# Patient Record
Sex: FEMALE | Race: WHITE | Employment: OTHER | URBAN - METROPOLITAN AREA
[De-identification: names, ages, dates, MRNs, and addresses within clinical notes are randomized per-mention and may not be internally consistent; named-entity substitution may affect disease eponyms.]

---

## 2017-04-03 ENCOUNTER — HOSPITAL ENCOUNTER (OUTPATIENT)
Dept: PHYSICAL THERAPY | Age: 72
Discharge: HOME | End: 2017-04-03
Payer: MEDICARE

## 2017-04-03 PROCEDURE — G8979 MOBILITY GOAL STATUS: HCPCS

## 2017-04-03 PROCEDURE — G8978 MOBILITY CURRENT STATUS: HCPCS

## 2017-04-03 PROCEDURE — 97162 PT EVAL MOD COMPLEX 30 MIN: CPT

## 2017-04-03 PROCEDURE — 97110 THERAPEUTIC EXERCISES: CPT

## 2017-04-03 NOTE — PROGRESS NOTES
PHYSICAL THERAPY - DAILY TREATMENT NOTE    Patient Name: Emily Bowie        Date: 4/3/2017  : 1945   YES Patient  Verified  Visit #:      of   8  Insurance: Payor: Bhargavi Edenilson / Plan: [place] MED[place] PART A & B / Product Type: Medicare /      In time: 2:10 Out time: 3:00   Total Treatment Time: 50     Medicare Time Tracking (below)   Total Timed Codes (min):  10 1:1 Treatment Time:  10     TREATMENT AREA =  L lumbar radiculopathy    SUBJECTIVE    Pain Level (on 0 to 10 scale):  1  / 10   Medication Changes/New allergies or changes in medical history, any new surgeries or procedures? NO    If yes, update Summary List   Subjective Functional Status/Changes:  []  No changes reported     Been ~1 year, started in the hip, now going into front of thigh, burning pain. \"GP thinks its a pinched nerve in the back. \" Had Xray but haven't gotten results back  \"I'm good for about 10 minutes of walking, then the pain starts and I have to sit down. \"  Reports numbness anterior L shin and dorsum of foot, not into toes; noticed for the first time over the weekend when walking a lot. Walking increases pain, sitting relieves pain. No longer taking pain meds (aleve)   1/10  Currently, 8/10 at the worst.           OBJECTIVE  LOW BACK EVALUATION      Previous Treatment: None    PMHx:see chart    Social/Recreational/Work: began exercising the gym 2016, 3-4x/wk    Pt Goals:decrease pain    General Health: Red Flags Indicated?  [ ] Yes Judith.[place] ] No  [ ] Yes [ ] No Recent weight change (If yes, due to dieting? [ ] Yes [ ] No)   [ ] Yes [ ] No Weakness in legs during walking  [ ] Yes [ ] No Unremitting pain at night  [ ] Yes [ ] No Abdominal pain or problems  [ ] Yes [ ] No Rectal bleeding  [ ] Yes [ ] No Feet more cold or painful in cold weather  [ ] Yes [ ] No Menstrual irregularities  [ ] Yes [ ] No Blood or pain with urination  [ ] Yes [ ] No Dysfunction of bowel or bladder  [ ] Yes [ ] No Recent illness within past 3 weeks (i.e, cold, flu)  [ ] Yes [ ] No Numbness/tingling in buttock/genitalia region          Objective:     Gait: unremarkable     Posture: slouched sitting posture, decreased lumbar lordosis    Palpation/Sensation: unremarkable     (N - normal; R - reduced; MR - markedly reduced)       L/S ROM      Range   Effect  Strength (MMT)          Right        Left    Flex WFL  Psoas (L2,3) 4- 3+   Ext 50%  Quads (L3) 5 5   R-lat flex WFL  Ant tib(L4) 5 5   L-lat flex WFL  Ext Iyer (L4,L5) NT NT   R-rot WFL  Glut Med(L5) 4- 4-   L-rot WFL  Peroneals (L5,S1) NT NT      Hamstrings(S1,S2) 5 4+     Strength (MMT)       Right     Left          Gastroc (S1,S2) NT NT   Glut Max (S1,S2) 4- 4-        Core: Sit Up 3 3   Core: Side Bridge NT NT   Core: Prone plank NT NT          Special Tests                       Right     Left          Flexibility         Right              Left    Slump   90/90 HS Lacking 25 deg Lacking 30 deg   SLR   Phillip Adamberg     Sl screen 3/5=70% LR   Tessie     Gaenslen test   Hip IR (prone)     Elkin/VALENTE sign   Hip ER (prone)     Thigh thrust        Distraction        Lateral Compression                  Effect of:  PETER No effect   Supine Bridge No effect   SL Supine Bridge                      SI Symmetry:    Standing        Supine            Prone                Dynamic      +/- R/L  ASIS  L sup, R inf  Fwd Flex    IC    Stork    PSIS   L inf, R sup Seated FF        10 min Therapeutic Exercise:  [x]  See flow sheet   Rationale:      increase ROM, increase strength, improve coordination, improve balance and increase proprioception to improve the patients ability to perform ADLs and functional mobility w/ ease     W/ TE min Patient Education:  YES  Reviewed HEP   []  Progressed/Changed HEP based on:   Issued HEP     Other Objective/Functional Measures:    FOTO 45/100, indicating 55% functional limitation   Post Treatment Pain Level (on 0 to 10) scale:   1  / 10     ASSESSMENT  Assessment/Changes in Function:     Justification for Eval Code Complexity:  Patient History (low 0, mod 1-2, high 3-4): asthma, chronicity of pain, scoliosis- HIGH  Examination (low 1-2, mod 3+, high 4+): decreased strength, decreased flexibility, impaired activity tolerance- MEDIUM   Clinical Presentation (low stable or uncomplicated, mod evolving or changing, high unstable or unpredictable): MEDIUM  Clinical Decision Making (low , mod 26-74, high 1-25): FOTO 45/100- MEDIUM     []  See Progress Note/Recertification   Patient will continue to benefit from skilled PT services to modify and progress therapeutic interventions, address functional mobility deficits, address ROM deficits, address strength deficits, analyze and address soft tissue restrictions, analyze and cue movement patterns, analyze and modify body mechanics/ergonomics, assess and modify postural abnormalities, address imbalance/dizziness and instruct in home and community integration to attain remaining goals. Progress toward goals / Updated goals:    See POC. PLAN    [x]  Upgrade activities as tolerated YES Continue plan of care   []  Discharge due to :    []  Other:      Therapist: Joellen Blum, PT    Date: 4/3/2017 Time: 2:09 PM     No future appointments.

## 2017-04-03 NOTE — PROGRESS NOTES
[address] [facility] PHYSICAL THERAPY  [address] [facility] [place], [address] - Phone: [phone]  Fax: [phone] / [address] [facility]  Patient Name: Lena Kate : 1945   Medical   Diagnosis: Low back pain [M54.5]  Lumbar radiculopathy [M54.16] Treatment Diagnosis: Low back pain, Lumbar radiculopathy   Onset Date: Chronic, ~1 year ago     Referral Source: Niels Swanson MD [facility]): 4/3/2017   Prior Hospitalization: See medical history Provider #: [id number]   Prior Level of Function: Independent, sedentary lifestyle   Comorbidities: Asthma, cerebral aneurysm    Medications: Verified on Patient Summary List   The Plan of Care and following information is based on the information from the initial evaluation.   ===========================================================================================  Assessment / key information:  Patient is a 73y.o. year old female with chief complaint of pain in low back, referring to L hip and anterior thigh. Patient reports pain in L hip began ~ 1 year ago, since has increased and refers into anterior thigh. Pain described as burning, currently 1/10 and 8/10 at its worst. Aggravating factors include walking > 10 minutes, alleviating factors include sitting. Patient with decreased LE/core strength, decreased ROM, increased muscle tightness. Unable to reproduce symptoms with repeated movements; pt reports the only thing that reproduces symptoms is walking and occasionally unsupported sitting. Pt demonstrates L ASIS superior in supine, L PSIS inferior in prone, (+) long sit test L short to long, indicating possible SIJ component. Patient with a Functional Status score of 45/100 on FOTO (Focused on Therapeutic Outcomes), which corresponds to a functional limitation of 55%.   Patient will benefit from skilled physical therapy services to address these issues.  ===========================================================================================  Eval Complexity: History: HIGH Complexity :3+ comorbidities / personal factors will impact the outcome/ POC Exam:MEDIUM Complexity : 3 Standardized tests and measures addressing body structure, function, activity limitation and / or participation in recreation  Presentation: MEDIUM Complexity : Evolving with changing characteristics  Clinical Decision Making:MEDIUM Complexity : FOTO score of 26-74Overall Complexity:MEDIUM    Problem List: pain affecting function, decrease ROM, decrease strength, impaired gait/ balance, decrease ADL/ functional abilitiies, decrease activity tolerance, decrease flexibility/ joint mobility and decrease transfer abilities   Treatment Plan may include any combination of the following: Therapeutic exercise, Therapeutic activities, Neuromuscular re-education, Physical agent/modality, Gait/balance training, Manual therapy, Aquatic therapy, Patient education, Self Care training, Functional mobility training, Home safety training and Stair training  Patient / Family readiness to learn indicated by: asking questions, trying to perform skills and interest  Persons(s) to be included in education: patient (P)  Barriers to Learning/Limitations: None  Measures taken: FOTO 45/100, indicating 55% functional impairment   Patient Goal (s): Decrease pain   Patient self reported health status: good  Rehabilitation Potential: good   Short Term Goals: To be accomplished in  2  weeks:  1. Patient will report able to walk >10 minutes w/out increase in symptoms for improved functional mobility. 2.  Patient will be compliant with home exercise program.     Long Term Goals: To be accomplished in  4  weeks:  1. Patient will increase FOTO Functional Status score to 56/100 to indicate decreased functional limitations.   2.  Patient will demonstrate B hip abd MMT >/= 4/5 for increased pelvic stability with gait.  3.  Patient will be independent with home exercise program for self management of symptoms. Frequency / Duration:   Patient to be seen  2  times per week for 4  weeks:  Patient / Caregiver education and instruction: self care, activity modification and exercises  G-Codes (GP): Mobility [id number] Current  CK= 40-59%   Goal  CK= 40-59%. The severity rating is based on the FOTO Score    Therapist Signature: Tati José PT Date: 1/8/4587   Certification Period: 4/3/2017-7/2/2017 Time: 3:24 PM   ===========================================================================================  I certify that the above Physical Therapy Services are being furnished while the patient is under my care. I agree with the treatment plan and certify that this therapy is necessary. Physician Signature:        Date:       Time:     Please sign and return to [employer] or you may fax the signed copy to [phone]. Thank you.

## 2017-04-05 ENCOUNTER — HOSPITAL ENCOUNTER (OUTPATIENT)
Dept: PHYSICAL THERAPY | Age: 72
Discharge: HOME | End: 2017-04-05
Payer: MEDICARE

## 2017-04-05 PROCEDURE — 97110 THERAPEUTIC EXERCISES: CPT

## 2017-04-05 NOTE — PROGRESS NOTES
PHYSICAL THERAPY - DAILY TREATMENT NOTE    Patient Name: Rebecca Boykin        Date: 2017  : 1945   YES Patient  Verified  Visit #:   2   of   8  Insurance: Payor: Nata Camel / Plan: [place] MEDICARE PART A & B / Product Type: Medicare /      In time: 9:05 Out time: 9:40   Total Treatment Time: 35     Medicare Time Tracking (below)   Total Timed Codes (min):  35 1:1 Treatment Time:  35     TREATMENT AREA =  L lumbar radiculopathy    SUBJECTIVE    Pain Level (on 0 to 10 scale):  0  / 10   Medication Changes/New allergies or changes in medical history, any new surgeries or procedures? NO    If yes, update Summary List   Subjective Functional Status/Changes:  []  No changes reported     Reports compliance with HEP. States she called the doctor yesterday and had them fax x ray report (see paper chart). OBJECTIVE    35 min Therapeutic Exercise:  [x]  See flow sheet   Rationale:      increase ROM and increase strength to improve the patients ability to perform ADL's, gait, and functional mobility with increased safety and decreased pain. min Patient Education:  YES  Reviewed HEP   []  Progressed/Changed HEP based on: Added HEP per handout in paper chart     Other Objective/Functional Measures: Added several exercises per flowsheet in paper chart. VC's for correct form with S/L clams     Post Treatment Pain Level (on 0 to 10) scale:   0  / 10     ASSESSMENT    Assessment/Changes in Function:     Tolerated exercises without c/o's pain.      []  See Progress Note/Recertification   Patient will continue to benefit from skilled PT services to modify and progress therapeutic interventions, address functional mobility deficits, address ROM deficits, address strength deficits, analyze and address soft tissue restrictions, analyze and cue movement patterns, analyze and modify body mechanics/ergonomics, assess and modify postural abnormalities and instruct in home and community integration to attain remaining goals. Progress toward goals / Updated goals: · Short Term Goals: To be accomplished in 2 weeks:  1. Patient will report able to walk >10 minutes w/out increase in symptoms for improved functional mobility. NT  2.  Patient will be compliant with home exercise program. Progressed HEP.        PLAN    [x]  Upgrade activities as tolerated YES Continue plan of care   []  Discharge due to :    []  Other:      Therapist: Ayden Waldron PT    Date: 4/5/2017 Time: 9:14 AM     Future Appointments  Date Time Provider Scott Metz   4/12/2017 5:30 PM Tara Morton [facility]   4/14/2017 2:00 PM [facility]   4/17/2017 3:30 PM Ayden Waldron PT [facility]   4/20/2017 11:30 AM Tara Morton [facility]   4/24/2017 11:00 AM Steve Sena [facility]   4/27/2017 2:00 PM Joselito [facility]

## 2017-04-07 ENCOUNTER — HOSPITAL ENCOUNTER (OUTPATIENT)
Dept: LAB | Age: 72
Discharge: HOME | End: 2017-04-07
Payer: MEDICARE

## 2017-04-07 PROCEDURE — 88305 TISSUE EXAM BY PATHOLOGIST: CPT | Performed by: PLASTIC SURGERY

## 2017-04-12 ENCOUNTER — HOSPITAL ENCOUNTER (OUTPATIENT)
Dept: PHYSICAL THERAPY | Age: 72
Discharge: HOME | End: 2017-04-12
Payer: MEDICARE

## 2017-04-12 PROCEDURE — 97530 THERAPEUTIC ACTIVITIES: CPT

## 2017-04-12 PROCEDURE — 97110 THERAPEUTIC EXERCISES: CPT

## 2017-04-12 NOTE — PROGRESS NOTES
PHYSICAL THERAPY - DAILY TREATMENT NOTE  -    Patient Name: Mary Carmen Hines        Date: 2017  : 1945   YES Patient  Verified  Visit #:   3   of   8  Insurance: Payor: [place] MEDICARE / Plan: [place] MEDICARE PART A & B / Product Type: Medicare /      In time: 1:00 Out time: 1:55   Total Treatment Time: 55 min     Medicare Time Tracking (below)   Total Timed Codes (min):  55 1:1 Treatment Time:  55     TREATMENT AREA =  Low back pain [M54.5]  Lumbar radiculopathy [M54.16]    SUBJECTIVE    Pain Level (on 0 to 10 scale):  0  / 10   Medication Changes/New allergies or changes in medical history, any new surgeries or procedures? NO    If yes, update Summary List   Subjective Functional Status/Changes:  []  No changes reported     \"I feel better but I still have that pain when I walk for 10 minutes. \"        OBJECTIVE    30 (30) min Therapeutic Exercise:  [x]  See flow sheet   Rationale:      increase ROM, increase strength, improve coordination, improve balance and increase proprioception to improve the patients ability to ambulate and perform transfers with improved core stability and LE strength.     25 (25) min Therapeutic Activity: Gait on TM; inc step rate (see below)   Rationale: To improve gait mechanics and strike pattern to improve ambulation endurance and dec onset of L hip pain    1 min Patient Education:  YES  Reviewed HEP   []  Progressed/Changed HEP based on:   (see below)     Other Objective/Functional Measures:    Assessed gait mechanics on TM. Patient ambulated with increased ankle angle of inclination and increased stride length. Pt steps per minute calculated to shanel 110-115 at 2.6mph. Provided patient external cueing with metronome to increase step rate to 122 steps/minute and patient demo decreased ankle inclination angle and improved tibial position at heel strike.  Patient able to ambulate 11 minutes without pain  - Provided patient with name of ladonna to use on phone ([employer]) to practice ambulating with metronome set to 120-125 bpm    Added bridges, hip abduction/extension standing, TA draw w/ marches     Post Treatment Pain Level (on 0 to 10) scale:   0  / 10     ASSESSMENT    Assessment/Changes in Function:     Patient ambulated 6' in clinic on TM without pain today. Understood instructions to utilize metronome to increase step rate. Tolerated therex progression today without increase in symptoms. []  See Progress Note/Recertification   Patient will continue to benefit from skilled PT services to modify and progress therapeutic interventions, address functional mobility deficits, address ROM deficits, address strength deficits, analyze and address soft tissue restrictions, analyze and cue movement patterns, analyze and modify body mechanics/ergonomics, assess and modify postural abnormalities, address imbalance/dizziness and instruct in home and community integration to attain remaining goals. Progress toward goals / Updated goals: · Short Term Goals: To be accomplished in 2 weeks:  1. Patient will report able to walk >10 minutes w/out increase in symptoms for improved functional mobility. Initiated inc step rate pattern for HEP  2.  Patient will be compliant with home exercise program. Reports compliance       PLAN    [x]  Upgrade activities as tolerated YES Continue plan of care   []  Discharge due to :    []  Other:      Therapist: Keary Favre, DPT    Date: 4/12/2017 Time: 1:29 PM     Future Appointments  Date Time Provider Scott Metz   4/14/2017 2:00 PM [facility]   4/17/2017 3:30 PM Carlin Valdez PT [facility]   4/20/2017 10:30 AM Carlin Valdez PT [facility]   4/24/2017 11:00 AM Missy Foote [facility]   4/27/2017 2:00 PM Patti Hilario [facility]

## 2017-04-14 ENCOUNTER — HOSPITAL ENCOUNTER (OUTPATIENT)
Dept: PHYSICAL THERAPY | Age: 72
Discharge: HOME | End: 2017-04-14
Payer: MEDICARE

## 2017-04-14 PROCEDURE — 97110 THERAPEUTIC EXERCISES: CPT

## 2017-04-14 NOTE — PROGRESS NOTES
PHYSICAL THERAPY - DAILY TREATMENT NOTE      Patient Name: Mayte Loredo        Date: 2017  : 1945   YES Patient  Verified  Visit #:   4   of   8  Insurance: Payor: [place] MEDICARE / Plan: [place] MEDICARE PART A & B / Product Type: Medicare /      In time: 1:50 Out time: 2:32   Total Treatment Time: 42     Medicare Time Tracking (below)   Total Timed Codes (min):  42 1:1 Treatment Time:  42     TREATMENT AREA =  Low back pain [M54.5]  Lumbar radiculopathy [M54.16]    SUBJECTIVE    Pain Level (on 0 to 10 scale):  0  / 10   Medication Changes/New allergies or changes in medical history, any new surgeries or procedures? NO    If yes, update Summary List   Subjective Functional Status/Changes:  []  No changes reported     \"I haven't had leg pain since last session. \"        OBJECTIVE    42 (42) min Therapeutic Exercise:  [x]  See flow sheet   Rationale:      increase strength, improve coordination, improve balance and increase proprioception to improve the patients ability to ambulate without LE pain and to improve core strength/stability. 1 min Patient Education:  YES  Reviewed HEP   []  Progressed/Changed HEP based on: Other Objective/Functional Measures:    TM warm up at 3.0 mph x 5 min with metronome set to 120 bpm. Patient without c/o L posterior thigh pain    Added alternating quad LE 15 x 5\"; patient unable to achieve proper pelvic stability with bird dogs. Post Treatment Pain Level (on 0 to 10) scale:   0  / 10     ASSESSMENT    Assessment/Changes in Function:     Patient TM walking to metronome (inc step rate) without posterior LLE pain; will progress TM time as tolerated. Tolerated progression of core and LE strengthening without pain or complications.      []  See Progress Note/Recertification   Patient will continue to benefit from skilled PT services to modify and progress therapeutic interventions, address functional mobility deficits, address ROM deficits, address strength deficits, analyze and address soft tissue restrictions, analyze and cue movement patterns, analyze and modify body mechanics/ergonomics, assess and modify postural abnormalities, address imbalance/dizziness and instruct in home and community integration to attain remaining goals. Progress toward goals / Updated goals: · Short Term Goals: To be accomplished in 2 weeks:  1. Patient will report able to walk >10 minutes w/out increase in symptoms for improved functional mobility. TM w/ metronome for step rate at 120 bpm  2.  Patient will be compliant with home exercise program. Reports compliance     PLAN    [x]  Upgrade activities as tolerated YES Continue plan of care   []  Discharge due to :    []  Other:      Therapist: Ciera Maya DPT    Date: 4/14/2017 Time: 1:56 PM     Future Appointments  Date Time Provider Scott Metz   4/14/2017 2:00 PM Minh Vallecillo [facility]   4/17/2017 3:30 PM Hudson Eid [facility]   4/20/2017 10:30 AM Hudson Eid [facility]   4/24/2017 11:00 AM Brittny Michel [facility]   [facility]

## 2017-04-17 ENCOUNTER — HOSPITAL ENCOUNTER (OUTPATIENT)
Dept: PHYSICAL THERAPY | Age: 72
Discharge: HOME | End: 2017-04-17
Payer: MEDICARE

## 2017-04-17 PROCEDURE — 97110 THERAPEUTIC EXERCISES: CPT

## 2017-04-17 NOTE — PROGRESS NOTES
PHYSICAL THERAPY - DAILY TREATMENT NOTE    Patient Name: Bri Roblero        Date: 2017  : 1945   YES Patient  Verified  Visit #:   5  of   8  Insurance: Payor: Elijahrosannachavo Jules / Plan: [place] MEDICARE PART A & B / Product Type: Medicare /      In time: 3:30 Out time: 4:15   Total Treatment Time: 45     Medicare Time Tracking (below)   Total Timed Codes (min):  45 1:1 Treatment Time:  45     TREATMENT AREA =  Low back pain [M54.5]  Lumbar radiculopathy [M54.16]    SUBJECTIVE    Pain Level (on 0 to 10 scale):  0  / 10   Medication Changes/New allergies or changes in medical history, any new surgeries or procedures? NO    If yes, update Summary List   Subjective Functional Status/Changes:  []  No changes reported     Patient reports she just walked 45 minutes at the grocery store with no pain. OBJECTIVE    45 min Therapeutic Exercise:  [x]  See flow sheet   Rationale:      increase ROM, increase strength and improve coordination to improve the patients ability to perform ADL's, gait, and functional mobility with decreased pain. min Patient Education:  YES  Reviewed HEP   []  Progressed/Changed HEP based on:   Cont HEP. Other Objective/Functional Measures:    Held TM today due to reports of walking for 45 minutes just before coming in to PT. Will resume TM next visit. Added minisquats, pallof press and progressed seated hip flexion/LAQ to on SB  Increased resistance with standing hip 3 way. Post Treatment Pain Level (on 0 to 10) scale:   0  / 10     ASSESSMENT    Assessment/Changes in Function:     Tolerated exercise progression without c/o pain. Patient demonstrated poor core stability with quad LE lifts and seated hip flexion/LAQ on Swiss Ball. Good subjective improvements in walking ability.       []  See Progress Note/Recertification   Patient will continue to benefit from skilled PT services to modify and progress therapeutic interventions, address functional mobility deficits, address ROM deficits, address strength deficits, analyze and address soft tissue restrictions, analyze and cue movement patterns, analyze and modify body mechanics/ergonomics, assess and modify postural abnormalities and instruct in home and community integration to attain remaining goals. Progress toward goals / Updated goals: · Short Term Goals: To be accomplished in 2 weeks:  1. Patient will report able to walk >10 minutes w/out increase in symptoms for improved functional mobility. Reports ability to walk 45' in grocery store with no pain.   2. Patient will be compliant with home exercise program. Reports compliance     PLAN    [x]  Upgrade activities as tolerated YES Continue plan of care   []  Discharge due to :    []  Other:      Therapist: Laly Villagran PT    Date: 4/17/2017 Time: 3:33 PM     Future Appointments  Date Time Provider Scott Metz   4/20/2017 10:30 AM Sheng horn PT [facility]   4/26/2017 1:00 PM [address] [facility]   4/27/2017 2:00 PM [facility]   5/1/2017 2:00 PM [facility]   5/3/2017 1:00 PM [facility]   5/9/2017 1:00 PM Parviz ILYA [facility]   5/11/2017 1:00 PM Parviz ILYA [facility]   5/15/2017 2:00 PM Parviz ILYA Adelfo [facility]   5/18/2017 1:00 PM Parviz ILYA [facility]   5/22/2017 2:00 PM Parviz ILYA [facility]   5/25/2017 1:00 PM [facility]

## 2017-04-20 ENCOUNTER — HOSPITAL ENCOUNTER (OUTPATIENT)
Dept: PHYSICAL THERAPY | Age: 72
Discharge: HOME | End: 2017-04-20
Payer: MEDICARE

## 2017-04-20 PROCEDURE — 97110 THERAPEUTIC EXERCISES: CPT

## 2017-04-20 NOTE — PROGRESS NOTES
PHYSICAL THERAPY - DAILY TREATMENT NOTE    Patient Name: Hardeep Graves        Date: 2017  : 1945   YES Patient  Verified  Visit #:   6   of   8  Insurance: Payor: Lloyd Patel / Plan: [place] MED[place] / Product Type: Medicare /      In time: 10:25 Out time: 11:10   Total Treatment Time: 45     Medicare Time Tracking (below)   Total Timed Codes (min):  45 1:1 Treatment Time:  45     TREATMENT AREA = Low back pain [M54.5]  Lumbar radiculopathy [M54.16]    SUBJECTIVE    Pain Level (on 0 to 10 scale):  1  / 10   Medication Changes/New allergies or changes in medical history, any new surgeries or procedures? NO    If yes, update Summary List   Subjective Functional Status/Changes:  []  No changes reported     \"This is really helping. I haven't had any pain in my hip recently. \"          OBJECTIVE    45 min Therapeutic Exercise:  [x]  See flow sheet   Rationale:      increase ROM and increase strength to improve the patients ability to perform ADL's, gait, and functional mobility with decreased pain. min Patient Education:  YES  Reviewed HEP   []  Progressed/Changed HEP based on:   Cont HEP     Other Objective/Functional Measures:    TM ambulation @ 3.0 mph with metronome timer set at 120 bpm for 5 min and then 123  bpm for 5 min. Added pallof press # 2, partial crunch and chop with medicine ball in supine. Progressed supine bridge per flowsheet. Performed seated hip flexion/LAQ on [employer] with B UE assistance for stability but only intermittent assistance from PT today.      Post Treatment Pain Level (on 0 to 10) scale:   0  / 10     ASSESSMENT    Assessment/Changes in Function:     Patient able to perform seated LAQ/hip flexion on [employer] today with less assistance from PT.     []  See Progress Note/Recertification   Patient will continue to benefit from skilled PT services to modify and progress therapeutic interventions, address functional mobility deficits, address ROM deficits, address strength deficits, analyze and address soft tissue restrictions, analyze and cue movement patterns, analyze and modify body mechanics/ergonomics, assess and modify postural abnormalities and instruct in home and community integration to attain remaining goals. Progress toward goals / Updated goals:    Short term goals met. Working toward LTG's.          PLAN    [x]  Upgrade activities as tolerated YES Continue plan of care   []  Discharge due to :    []  Other:      Therapist: Ismael Mayberry PT    Date: 4/20/2017 Time: 10:42 AM     Future Appointments  Date Time Provider Scott Metz   4/26/2017 1:00 PM [facility]   4/27/2017 2:00 PM [facility]   5/1/2017 2:00 PM [facility]   5/3/2017 1:00 PM [facility]   5/9/2017 1:00 PM [facility]   5/11/2017 1:00 PM [facility]   5/15/2017 2:00 PM [facility]   5/18/2017 1:00 PM [facility]   5/22/2017 2:00 PM [facility]   5/25/2017 1:00 PM [facility]

## 2017-04-24 ENCOUNTER — APPOINTMENT (OUTPATIENT)
Dept: PHYSICAL THERAPY | Age: 72
End: 2017-04-24
Payer: MEDICARE

## 2017-04-26 ENCOUNTER — HOSPITAL ENCOUNTER (OUTPATIENT)
Dept: PHYSICAL THERAPY | Age: 72
Discharge: HOME | End: 2017-04-26
Payer: MEDICARE

## 2017-04-26 PROCEDURE — 97110 THERAPEUTIC EXERCISES: CPT

## 2017-04-26 NOTE — PROGRESS NOTES
PHYSICAL THERAPY - DAILY TREATMENT NOTE      Patient Name: Joanne Almeida        Date: 2017  : 1945   YES Patient  Verified  Visit #:   7   of     Insurance: Payor: Jp Bowen / Plan: [place] MEDICARE PART A & B / Product Type: Medicare /      In time: 1:00 Out time: 1:45   Total Treatment Time: 45 min     Medicare Time Tracking (below)   Total Timed Codes (min):  45 1:1 Treatment Time:  38     TREATMENT AREA =  Low back pain [M54.5]  Lumbar radiculopathy [M54.16]    SUBJECTIVE    Pain Level (on 0 to 10 scale):  0  / 10   Medication Changes/New allergies or changes in medical history, any new surgeries or procedures? NO    If yes, update Summary List   Subjective Functional Status/Changes:  []  No changes reported     \"I haven't had pain in a while now. \"        OBJECTIVE    45 (38) min Therapeutic Exercise:  [x]  See flow sheet   Rationale:      increase ROM, increase strength, improve coordination, improve balance and increase proprioception to improve the patients ability to perform hobbies and recreational activities with dec pain. 1 min Patient Education:  YES  Reviewed HEP   []  Progressed/Changed HEP based on: Other Objective/Functional Measures: Added side oblique MB chops with 4\" MB  Progressed therex per flowsheet without complaint. Post Treatment Pain Level (on 0 to 10) scale:   0  / 10     ASSESSMENT    Assessment/Changes in Function:     Patient without LLE symptoms in past week. Tolerating core therex progression very well w/o inc symptoms.      []  See Progress Note/Recertification   Patient will continue to benefit from skilled PT services to modify and progress therapeutic interventions, address functional mobility deficits, address ROM deficits, address strength deficits, analyze and address soft tissue restrictions, analyze and cue movement patterns, analyze and modify body mechanics/ergonomics, assess and modify postural abnormalities, address imbalance/dizziness and instruct in home and community integration to attain remaining goals. Progress toward goals / Updated goals: · Short Term Goals: To be accomplished in 2 weeks:  1. Patient will report able to walk >10 minutes w/out increase in symptoms for improved functional mobility. MET  2.  Patient will be compliant with home exercise program. MET       PLAN    [x]  Upgrade activities as tolerated YES Continue plan of care   []  Discharge due to :    []  Other:      Therapist: Alfred Hilario DPT    Date: 4/26/2017 Time: 1:06 PM     Future Appointments  Date Time Provider Scott Metz   4/27/2017 2:00 PM [facility]   5/1/2017 2:00 PM [facility]   5/3/2017 1:00 PM [facility]   5/9/2017 1:00 PM [facility]   5/11/2017 1:00 PM [facility]   5/15/2017 2:00 PM [facility]   5/18/2017 1:00 PM [facility]   5/22/2017 2:00 PM [facility]   5/25/2017 1:00 PM [facility]

## 2017-04-27 ENCOUNTER — HOSPITAL ENCOUNTER (OUTPATIENT)
Dept: PHYSICAL THERAPY | Age: 72
Discharge: HOME | End: 2017-04-27
Payer: MEDICARE

## 2017-04-27 PROCEDURE — 97110 THERAPEUTIC EXERCISES: CPT

## 2017-05-01 ENCOUNTER — HOSPITAL ENCOUNTER (OUTPATIENT)
Dept: PHYSICAL THERAPY | Age: 72
Discharge: HOME | End: 2017-05-01
Payer: MEDICARE

## 2017-05-01 PROCEDURE — 97140 MANUAL THERAPY 1/> REGIONS: CPT

## 2017-05-01 PROCEDURE — 97530 THERAPEUTIC ACTIVITIES: CPT

## 2017-05-01 PROCEDURE — 97110 THERAPEUTIC EXERCISES: CPT

## 2017-05-01 PROCEDURE — G8980 MOBILITY D/C STATUS: HCPCS

## 2017-05-01 PROCEDURE — G8979 MOBILITY GOAL STATUS: HCPCS

## 2017-05-01 NOTE — PROGRESS NOTES
[address] [facility] PHYSICAL THERAPY  [address], [address] - Phone: [phone]  Fax: [phone]  PROGRESS NOTE  Patient Name: Joanne Almeida : 1945   Treatment/Medical Diagnosis: Low back pain [M54.5]  Lumbar radiculopathy [M54.16]   Referral Source: Jonathan Mccarthy MD     Date of Initial Visit: 4/3/17 Attended Visits: 9 Missed Visits: 0     SUMMARY OF TREATMENT  Patient's POC has consisted of active warm-up on Nu-Step, LE stretching/strengthening, core stabilization interventions, gait assessment with alteration of LE strike pattern to dec L posterior thigh pain. Key Functional Changes/Progress  Patient currently denies any L posterior radicular pain since initiation of PT. She demonstrates improved LE strength and is compliant with prescribed HEP. Pt does continue to demo dec hip flexion/ER strength bilaterally more pronounced in LLE vs RLE. In addition, she c/o intermittent LBP/tightness with increased activity levels. Pt's FOTO functional score improved from 45% at IE to 67% today indicating decreased disability. ROM/Strength Strength (1-5)  Hip Left Right   Flexion 4 4+   Extension 4 4+   Abduction 4+ 4+   ER 4 4   IR 5 5   Knee Left Right   Extension 5 5   Flexion 5 5       Goal/Measure of Progress Goal Met? 1. Patient will increase FOTO Functional Status score to 56/100 to indicate decreased functional limitations. 2. Patient will demonstrate B hip abd MMT >/= 4/5 for increased pelvic stability with gait. 3. Patient will be independent with home exercise program for self management of symptoms. 1. MET    2. MET    3. Progressing     New Goals to be achieved in __3-4__  weeks:  1. Patient will be independent with home exercise program for self management of symptoms. 2. Patient will demonstrate ability to stoop and recover with minimal use of UE support to improve tolerance for gardening and other hobbies  3.  Patient will improve FOTO functional score to 75% in order to demo decreased disability. Frequency / Duration:   Patient to be seen  1-2  times per week for 3-4  weeks:    G-Codes: Mobility   Goal  CK= 40-59%  D/C  CJ= 20-39%. The severity rating is based on the FOTO Score     RECOMMENDATIONS  Continue and progress functional therex/therapeutic activity as able, utilizing manual therapy and modalities prn. Progress patient towards independent HEP to facilitate self-management of symptoms and progress gains after PT. If you have any questions/comments please contact us directly at [phone]. Thank you for allowing us to assist in the care of your patient. Therapist Signature: Norm Wasserman DPT Date: 5/2/5626   Certification Period: 4/3/17 - 7/2/17     Reporting Period 4/3/17 - 5/1/17   Time: 2:03 PM   NOTE TO PHYSICIAN:  PLEASE COMPLETE THE ORDERS BELOW AND FAX TO   [facility]: [phone]. If you are unable to process this request in 24 hours please contact our office: [phone].    ___ I have read the above report and request that my patient continue as recommended.   ___ I have read the above report and request that my patient continue therapy with the following changes/special instructions:_________________________________________________________   ___ I have read the above report and request that my patient be discharged from therapy.      Physician Signature:        Date:       Time:

## 2017-05-01 NOTE — PROGRESS NOTES
PHYSICAL THERAPY - DAILY TREATMENT NOTE      Patient Name: Enedina Roca        Date: 2017  : 1945   YES Patient  Verified  Visit #:     Insurance: Payor: Ashlee Watson / Plan: [place] MEDICARE PART A & B / Product Type: Medicare /      In time: 1:55 Out time: 2:50   Total Treatment Time: 55 min     Medicare Time Tracking (below)   Total Timed Codes (min):  55 1:1 Treatment Time:  55     TREATMENT AREA =  Low back pain [M54.5]  Lumbar radiculopathy [M54.16]    SUBJECTIVE    Pain Level (on 0 to 10 scale):  2  / 10 LBP   Medication Changes/New allergies or changes in medical history, any new surgeries or procedures? NO    If yes, update Summary List   Subjective Functional Status/Changes:  []  No changes reported     \"My back is bothering me a little today. No pain in the L leg though. \" Patient reports she was entertaining all weekend and believes pain is from this. OBJECTIVE    37 (37) min Therapeutic Exercise:  [x]  See flow sheet   Rationale:      increase ROM, increase strength, improve coordination, improve balance and increase proprioception to improve the patients ability to perform recreation/hobbies with dec LBP     10 (10) min Therapeutic Activity: FOTO, Re-assessment   Rationale: To assess current functional limitations and review POC    8 (8) min Manual Therapy: DTM/TrP to R lumbar paraspinals, grade 2-3 posterior/inferior SIJ mobs   Rationale:      decrease pain, increase ROM, increase tissue extensibility and increase postural awareness to improve patient's ability to ambulate and perform ADLs with dec LBP    1 min Patient Education:  YES  Reviewed HEP   []  Progressed/Changed HEP based on:         Other Objective/Functional Measures:    See PN     Post Treatment Pain Level (on 0 to 10) scale:   0  / 10     ASSESSMENT    Assessment/Changes in Function:     See PN     []  See Progress Note/Recertification   Patient will continue to benefit from skilled PT services to modify and progress therapeutic interventions, address functional mobility deficits, address ROM deficits, address strength deficits, analyze and address soft tissue restrictions, analyze and cue movement patterns, analyze and modify body mechanics/ergonomics, assess and modify postural abnormalities and instruct in home and community integration to attain remaining goals.      Progress toward goals / Updated goals:     See PN     PLAN    [x]  Upgrade activities as tolerated YES Continue plan of care   []  Discharge due to :    []  Other:      Therapist: Alanna Saavedra DPT    Date: 5/1/2017 Time: 2:00 PM     Future Appointments  Date Time Provider Scott Metz   5/3/2017 1:00 PM [facility]   5/9/2017 1:00 PM [facility]   5/11/2017 1:00 PM [facility]   5/15/2017 2:00 PM [facility]   5/18/2017 1:00 PM [facility]   5/22/2017 2:00 PM [facility]   5/25/2017 1:00 PM [facility]

## 2017-05-03 ENCOUNTER — HOSPITAL ENCOUNTER (OUTPATIENT)
Dept: PHYSICAL THERAPY | Age: 72
Discharge: HOME | End: 2017-05-03
Payer: MEDICARE

## 2017-05-03 PROCEDURE — 97110 THERAPEUTIC EXERCISES: CPT

## 2017-05-03 PROCEDURE — G8979 MOBILITY GOAL STATUS: HCPCS

## 2017-05-03 PROCEDURE — G8978 MOBILITY CURRENT STATUS: HCPCS

## 2017-05-03 NOTE — PROGRESS NOTES
PHYSICAL THERAPY - DAILY TREATMENT NOTE      Patient Name: Myke Montoya        Date: 5/3/2017  : 1945   YES Patient  Verified  Visit #:   10   of     Insurance: Payor: Ludy Pack / Plan: [place] MEDICARE PART A & B / Product Type: Medicare /      In time: 1:00 Out time: 1:38   Total Treatment Time: 38 min     Medicare Time Tracking (below)   Total Timed Codes (min):  38 1:1 Treatment Time:  38     TREATMENT AREA =  Low back pain [M54.5]  Lumbar radiculopathy [M54.16]    SUBJECTIVE    Pain Level (on 0 to 10 scale):  0  / 10   Medication Changes/New allergies or changes in medical history, any new surgeries or procedures? NO    If yes, update Summary List   Subjective Functional Status/Changes:  []  No changes reported     \"I'm doing really well. \"        OBJECTIVE    38 (38) min Therapeutic Exercise:  [x]  See flow sheet   Rationale:      increase ROM, increase strength, improve coordination, improve balance and increase proprioception to improve the patients ability to perform recreational activities and hobbies with inc ease and dec LBP/LE pain     1 min Patient Education:  YES  Reviewed HEP   []  Progressed/Changed HEP based on:   Provided comprehensive HEP handout     Other Objective/Functional Measures:    Min VC required for proper therex performance  Difficulty noted with bird dogs particularly with RLE ext/LUE ext  Added planks and side planks 3 x 5\" each  HHA required for split stance lunges     Post Treatment Pain Level (on 0 to 10) scale:   0  / 10     ASSESSMENT    Assessment/Changes in Function:     Tolerating therex progression very well and w/o inc pain.      []  See Progress Note/Recertification   Patient will continue to benefit from skilled PT services to modify and progress therapeutic interventions, address functional mobility deficits, address ROM deficits, address strength deficits, analyze and address soft tissue restrictions, analyze and cue movement patterns, analyze and modify body mechanics/ergonomics, assess and modify postural abnormalities, address imbalance/dizziness and instruct in home and community integration to attain remaining goals. Progress toward goals / Updated goals:    New Goals to be achieved in __3-4__ weeks:  1. Patient will be independent with home exercise program for self management of symptoms. Provided comprehensive HEP today  2. Patient will demonstrate ability to stoop and recover with minimal use of UE support to improve tolerance for gardening and other hobbies. 3. Patient will improve FOTO functional score to 75% in order to demo decreased disability.         PLAN    [x]  Upgrade activities as tolerated YES Continue plan of care   []  Discharge due to :    []  Other:      Therapist: Melquiades Keane DPT    Date: 5/3/2017 Time: 1:12 PM     Future Appointments  Date Time Provider Scott Metz   5/9/2017 1:00 PM [facility]   5/11/2017 1:00 PM [facility]   5/15/2017 2:00 PM [facility]   5/18/2017 1:00 PM [facility]   5/22/2017 2:00 PM [facility]   5/25/2017 1:00 PM [facility]

## 2017-05-09 ENCOUNTER — HOSPITAL ENCOUNTER (OUTPATIENT)
Dept: PHYSICAL THERAPY | Age: 72
Discharge: HOME | End: 2017-05-09
Payer: MEDICARE

## 2017-05-09 PROCEDURE — 97112 NEUROMUSCULAR REEDUCATION: CPT

## 2017-05-09 PROCEDURE — 97110 THERAPEUTIC EXERCISES: CPT

## 2017-05-09 NOTE — PROGRESS NOTES
PHYSICAL THERAPY - DAILY TREATMENT NOTE      Patient Name: Ronald Wilcox        Date: 2017  : 1945   YES Patient  Verified  Visit #:     Insurance: Payor: Krystian Bajwa / Plan: [place] MEDICARE PART A & B / Product Type: Medicare /      In time: 1:00 Out time: 1:47   Total Treatment Time: 47 min     Medicare Time Tracking (below)   Total Timed Codes (min):  47 1:1 Treatment Time:  47     TREATMENT AREA =  Low back pain [M54.5]  Lumbar radiculopathy [M54.16]    SUBJECTIVE    Pain Level (on 0 to 10 scale):  0  / 10   Medication Changes/New allergies or changes in medical history, any new surgeries or procedures? NO    If yes, update Summary List   Subjective Functional Status/Changes:  []  No changes reported     \"I'm feeling good today. \"        OBJECTIVE    37 (30) min Therapeutic Exercise:  [x]  See flow sheet   Rationale:      increase ROM, increase strength, improve coordination, improve balance and increase proprioception to improve the patients ability to perform ADLs without LBP     10 (10) min Neuromuscular Re-ed: MSR EO/EC, SLS, SR EO   Rationale:   increase ROM, increase strength, improve coordination, improve balance and increase proprioception to improve the patients ability to ambulate with improved stance stability     1 min Patient Education:  YES  Reviewed HEP   []  Progressed/Changed HEP based on: Other Objective/Functional Measures:    SR EO 30\" bilaterally  MSR EC at instep 30\" bilaterally   Foam SR EO R 28\", L 10\"  SLS EO R 30\", L 28\"    2 finger assist for foam hip abduction/extension     Post Treatment Pain Level (on 0 to 10) scale:   0  / 10     ASSESSMENT    Assessment/Changes in Function:     Initiated balance interventions today. Pt demo inc difficulty with LLE versus RLE.      []  See Progress Note/Recertification   Patient will continue to benefit from skilled PT services to modify and progress therapeutic interventions, address functional mobility deficits, address ROM deficits, address strength deficits, analyze and address soft tissue restrictions, analyze and cue movement patterns, analyze and modify body mechanics/ergonomics, assess and modify postural abnormalities, address imbalance/dizziness and instruct in home and community integration to attain remaining goals. Progress toward goals / Updated goals:    New Goals to be achieved in __3-4__ weeks:  1. Patient will be independent with home exercise program for self management of symptoms. Provided comprehensive HEP today  2. Patient will demonstrate ability to stoop and recover with minimal use of UE support to improve tolerance for gardening and other hobbies.   3. Patient will improve FOTO functional score to 75% in order to demo decreased disability.           PLAN    [x]  Upgrade activities as tolerated YES Continue plan of care   []  Discharge due to :    []  Other:      Therapist: Gautam Ortiz DPT    Date: 5/9/2017 Time: 12:27 PM     Future Appointments  Date Time Provider Scott Metz   5/9/2017 1:00 PM [facility]   5/11/2017 1:00 PM [address]   5/15/2017 2:00 PM [facility]   5/18/2017 1:00 PM [facility]   5/22/2017 2:00 PM [facility]   5/25/2017 1:00 PM [facility]

## 2017-05-11 ENCOUNTER — HOSPITAL ENCOUNTER (OUTPATIENT)
Dept: PHYSICAL THERAPY | Age: 72
Discharge: HOME | End: 2017-05-11
Payer: MEDICARE

## 2017-05-11 PROCEDURE — 97110 THERAPEUTIC EXERCISES: CPT

## 2017-05-11 NOTE — PROGRESS NOTES
PHYSICAL THERAPY - DAILY TREATMENT NOTE      Patient Name: Mackenzie Disla        Date: 2017  : 1945   YES Patient  Verified  Visit #:     Insurance: Payor: Sangeetha Cisneros / Plan: [place] MEDICARE PART A & B / Product Type: Medicare /      In time: 12:58 Out time: 1:45   Total Treatment Time: 47     Medicare Time Tracking (below)   Total Timed Codes (min):  47 1:1 Treatment Time:  38     TREATMENT AREA =  Low back pain [M54.5]  Lumbar radiculopathy [M54.16]    SUBJECTIVE    Pain Level (on 0 to 10 scale):  0  / 10   Medication Changes/New allergies or changes in medical history, any new surgeries or procedures? NO    If yes, update Summary List   Subjective Functional Status/Changes:  []  No changes reported     \"I'm doing good. \"        OBJECTIVE    47 (38) min Therapeutic Exercise:  [x]  See flow sheet   Rationale:      increase ROM, increase strength, improve coordination, improve balance and increase proprioception to improve the patients ability to improve LE stability during recreational activities and hobbies. 1 min Patient Education:  YES  Reviewed HEP   []  Progressed/Changed HEP based on: Other Objective/Functional Measures: Added standing hamstring stretch at step bilaterally x 30\"  Slight TC required to correct R dynamic valgus during SL squat on TG L8.  SLS instep EC 30\" bilaterally     Post Treatment Pain Level (on 0 to 10) scale:   0  / 10     ASSESSMENT    Assessment/Changes in Function:     Patient continues to demo difficulty with high level core exercises. Tolerating progression very well however.      []  See Progress Note/Recertification   Patient will continue to benefit from skilled PT services to modify and progress therapeutic interventions, address functional mobility deficits, address ROM deficits, address strength deficits, analyze and address soft tissue restrictions, analyze and cue movement patterns, analyze and modify body mechanics/ergonomics, assess and modify postural abnormalities, address imbalance/dizziness and instruct in home and community integration to attain remaining goals. Progress toward goals / Updated goals:    New Goals to be achieved in __3-4__ weeks:  1. Patient will be independent with home exercise program for self management of symptoms. Demonstrates compliance  2. Patient will demonstrate ability to stoop and recover with minimal use of UE support to improve tolerance for gardening and other hobbies. Added walking lunges  3. Patient will improve FOTO functional score to 75% in order to demo decreased disability.        PLAN    [x]  Upgrade activities as tolerated YES Continue plan of care   []  Discharge due to :    []  Other:      Therapist: Jia Pink DPT    Date: 5/11/2017 Time: 11:15 AM     Future Appointments  Date Time Provider Scott Metz   5/11/2017 1:00 PM [facility]   5/15/2017 2:00 PM [facility]   5/18/2017 1:00 PM [facility]   5/22/2017 2:00 PM [facility]   5/25/2017 1:00 PM [facility]

## 2017-05-15 ENCOUNTER — HOSPITAL ENCOUNTER (OUTPATIENT)
Dept: PHYSICAL THERAPY | Age: 72
Discharge: HOME | End: 2017-05-15
Payer: MEDICARE

## 2017-05-15 PROCEDURE — 97110 THERAPEUTIC EXERCISES: CPT

## 2017-05-15 NOTE — PROGRESS NOTES
PHYSICAL THERAPY - DAILY TREATMENT NOTE      Patient Name: Jovan Walden        Date: 5/15/2017  : 1945   YES Patient  Verified  Visit #:   15   of   18  Insurance: Payor: Zbigniew Dalton / Plan: [place] MED[place] & B / Product Type: Medicare /      In time: 2:05 Out time: 2:50   Total Treatment Time: 45     Medicare Time Tracking (below)   Total Timed Codes (min):  45 1:1 Treatment Time:  38     TREATMENT AREA =  Low back pain [M54.5]  Lumbar radiculopathy [M54.16]    SUBJECTIVE    Pain Level (on 0 to 10 scale):  0  / 10   Medication Changes/New allergies or changes in medical history, any new surgeries or procedures? NO    If yes, update Summary List   Subjective Functional Status/Changes:  []  No changes reported     \" I'm feeling good. \"       OBJECTIVE    45 (38) min Therapeutic Exercise:  [x]  See flow sheet   Rationale:      increase ROM, increase strength, improve coordination, improve balance and increase proprioception to improve the patients ability to perform recreational activities and hobbies with improved lumbar stability. 1 min Patient Education:  YES  Reviewed HEP   []  Progressed/Changed HEP based on: Other Objective/Functional Measures:    Decreasing need for VC/TC for proper therex performance  Added seated marches/LAQ on DD     Post Treatment Pain Level (on 0 to 10) scale:   0  / 10     ASSESSMENT    Assessment/Changes in Function:     Pt with improving core stability during therex.      []  See Progress Note/Recertification   Patient will continue to benefit from skilled PT services to modify and progress therapeutic interventions, address functional mobility deficits, address ROM deficits, address strength deficits, analyze and address soft tissue restrictions, analyze and cue movement patterns, analyze and modify body mechanics/ergonomics, assess and modify postural abnormalities, address imbalance/dizziness and instruct in home and community integration to attain remaining goals. Progress toward goals / Updated goals:    New Goals to be achieved in __3-4__ weeks:  1. Patient will be independent with home exercise program for self management of symptoms. 2. Patient will demonstrate ability to stoop and recover with minimal use of UE support to improve tolerance for gardening and other hobbies. Walking lunges bilaterally  3. Patient will improve FOTO functional score to 75% in order to demo decreased disability.        PLAN    [x]  Upgrade activities as tolerated YES Continue plan of care   []  Discharge due to :    []  Other:      Therapist: Collin Gilliam DPT    Date: 5/15/2017 Time: 11:36 AM     Future Appointments  Date Time Provider Scott Metz   5/15/2017 2:00 PM [facility]   5/18/2017 1:00 PM [facility]   5/22/2017 2:00 PM [facility]   5/25/2017 1:00 PM [facility]

## 2017-05-18 ENCOUNTER — APPOINTMENT (OUTPATIENT)
Dept: PHYSICAL THERAPY | Age: 72
End: 2017-05-18
Payer: MEDICARE

## 2017-05-18 ENCOUNTER — HOSPITAL ENCOUNTER (OUTPATIENT)
Dept: LAB | Age: 72
Discharge: HOME | End: 2017-05-18
Payer: COMMERCIAL

## 2017-05-18 PROCEDURE — 88305 TISSUE EXAM BY PATHOLOGIST: CPT | Performed by: PLASTIC SURGERY

## 2017-05-18 PROCEDURE — 88331 PATH CONSLTJ SURG 1 BLK 1SPC: CPT | Performed by: PLASTIC SURGERY

## 2017-05-18 PROCEDURE — 88332 PATH CONSLTJ SURG EA ADD BLK: CPT | Performed by: PLASTIC SURGERY

## 2017-05-22 ENCOUNTER — APPOINTMENT (OUTPATIENT)
Dept: PHYSICAL THERAPY | Age: 72
End: 2017-05-22
Payer: MEDICARE

## 2017-05-25 ENCOUNTER — APPOINTMENT (OUTPATIENT)
Dept: PHYSICAL THERAPY | Age: 72
End: 2017-05-25
Payer: MEDICARE

## 2017-05-25 NOTE — PROGRESS NOTES
[address] [facility] PHYSICAL THERAPY  [address], [address] - Phone: [phone]  Fax: [phone] SUMMARY  Patient Name: Westley Peña : 1945   Treatment/Medical Diagnosis: Low back pain [M54.5]  Lumbar radiculopathy [M54.16]   Referral Source: Maria Esther Arzola MD     Date of Initial Visit: 4/3/17 Attended Visits: 13 Missed Visits: 0     SUMMARY OF TREATMENT  Patient's POC has consisted of active warm-up on Nu-Step, LE stretching/strengthening, core stabilization interventions, gait assessment with alteration of LE strike pattern to dec L posterior thigh pain. Patient called on  stating she \"underwent a surgery\" and would not be able to return to physical therapy. Throughout PT, patient was a pleasure to work with and she progressed very well. She was pain free and without radicular symptoms for several weeks at the time of D/C. She was informed that she can  comprehensive HEP at our office at her [address] [facility] therapy. Progressing towards or have reached established goals. If you have any questions/comments please contact us directly at [phone]. Thank you for allowing us to assist in the care of your patient. Therapist Signature:  Shelly Ch DPT Date: 17   Reporting Period: 17 - 5/15/17 Time: 3:17 PM

## 2017-07-07 ENCOUNTER — HOSPITAL ENCOUNTER (OUTPATIENT)
Dept: PHYSICAL THERAPY | Age: 72
Discharge: HOME | End: 2017-07-07
Payer: MEDICARE

## 2017-07-07 PROCEDURE — 97110 THERAPEUTIC EXERCISES: CPT

## 2017-07-07 PROCEDURE — 97161 PT EVAL LOW COMPLEX 20 MIN: CPT

## 2017-07-07 PROCEDURE — 97530 THERAPEUTIC ACTIVITIES: CPT

## 2017-07-07 PROCEDURE — G8979 MOBILITY GOAL STATUS: HCPCS

## 2017-07-07 PROCEDURE — G8978 MOBILITY CURRENT STATUS: HCPCS

## 2017-07-07 NOTE — PROGRESS NOTES
PHYSICAL THERAPY - DAILY TREATMENT NOTE    Patient Name: Luba Turner        Date: 2017  : 1945   YES Patient  Verified  Visit #:   1   of   -  Insurance: Payor: [place] MEDICARE / Plan: [place] MEDICARE PART A & B / Product Type: Medicare /      In time: 1:05 Out time: 1:50   Total Treatment Time: 45 min     Medicare Time Tracking (below)   Total Timed Codes (min):  25 1:1 Treatment Time:  45     TREATMENT AREA =  LBP    SUBJECTIVE    Pain Level (on 0 to 10 scale):  0  / 10   Medication Changes/New allergies or changes in medical history, any new surgeries or procedures? NO    If yes, update Summary List   Subjective Functional Status/Changes:  []  No changes reported     Patient reports she is presenting back to PT. Had to stop before secondary to having stitches in RLE d/t removal of squamous cell skin cancer. Pt reports she has occasional (2x) \"pinching\" in low back when moving from sitting to standing position. OBJECTIVE    Physical Therapy Evaluation - Lumbar Spine ([facility])    SUBJECTIVE  Chief Complaint: Intermittent LBP    Mechanism of injury: Insidious    Occupation/Recreation: Retired, working out 2-3 days/week    Functional Status  Prior level of function: Hx lumbar radiculopathy - LLE  Present functional limitations: minimal  What position do you sleep in?: Supine    Symptoms:  Pain rating (0-10): Today: 0/10   Best: 0   Worst:  6/10   [] Contstant:    [x] Intermittent:     Aggravated by: moving sitting to standing   [] Bending [] Sitting [] Standing [] Walking   [] Moving [] Cough [] Sneeze [] Valsalva   [] AM  [] PM  Lying:  [] sup   [] pro   [] sidelying   [] Other:     Eased by:    [] Bending [] Sitting [] Standing [] Walking   [] Moving [] AM  [] PM  Lying: [] sup  [] pro  [] sidelying   [] Other:     General Health:  Red Flags Indicated? [] Yes    [x] No  [] Yes [] No Recent weight change (If yes, due to dieting?  [] Yes  [] No)   [] Yes [] No Weakness in legs during walking  [] Yes [] No Unremitting pain at night  [] Yes [] No Abdominal pain or problems  [] Yes [] No Rectal bleeding  [] Yes [] No Feet more cold or painful in cold weather  [] Yes [] No Menstrual irregularities  [] Yes [] No Blood or pain with urination  [] Yes [] No Dysfunction of bowel or bladder  [] Yes [] No Recent illness within past 3 weeks (i.e, cold, flu)  [] Yes [] No Numbness/tingling in buttock/genitalia region    Past History/Treatments:     Diagnostic Tests: [] Lab work [] X-rays    [] CT [] MRI     [] Other:  Results: None      OBJECTIVE  Posture:  Lateral Shift: [x] R    [x] L     [] +  [x] -  Kyphosis: [x] Increased [] Decreased   []  WNL  Lordosis:  [] Increased [] Decreased   [x] WNL  Pelvic symmetry: [x] WNL    [] Other:    Gait:  [x] Normal     [] Abnormal:    Active Movements: [] N/A   [] Too acute   [] Other:  ROM AROM Comments:pain, area   Forward flexion 40-60 Fingertips to ankles    Extension 20-30 25% limited    SB right 20-30 WNL    SB left 20-30 WNL    Rotation right 5-10 WNL    Rotation left 5-10 WNL        ROM/Strength       Strength (1-5)  Hip Left Right   Flexion (L1,L2) 5 5   Extension 4 4   Abduction 4+ 5   ER 3 3+   IR 5 4+   Knee Left Right   Extension (L3,L4) 5 5   Flexion (S1,S2) 5 5   Ankle Left Right   PF (S1) 4+ 4   DF (L4) 5 5       Neuro Screen [x] WNL    Dural Mobility:  SLR Sitting: [x] R    [x] L    [] +    [x] -  @ (degrees):           Supine: [x] R    [x] L    [] +    [x] -  @ (degrees):   Slump Test: [x] R    [x] L    [] +    [x] -  @ (degrees):   Prone Knee Bend: [x] R    [x] L    [] +    [x] -     Palpation  [x] Min  [] Mod  [] Severe    Location: L/S       17 (17) min Therapeutic Exercise:  [x]  See flow sheet   Rationale:      increase ROM, increase strength, improve coordination, improve balance and increase proprioception to improve the patients core stability to return to recreational activities safety and dec occurrence of lumbar radicular symptoms 8 (8) min Therapeutic Activity: FOTO   Rationale: To assess current functional limitations and review POC    throughout min Patient Education:  YES  Reviewed HEP   []  Progressed/Changed HEP based on: Other Objective/Functional Measures:    See objective data above     Post Treatment Pain Level (on 0 to 10) scale:   0  / 10     ASSESSMENT    Assessment/Changes in Function:     Patient History: High (Age, Hx LLE lumbar radiculopathy, Hx tobacco use)  Examination (low 1-2, mod 3+, high 4+): Low per objective above  Clinical Presentation (low stable or uncomplicated, mod evolving or changing, high unstable or unpredictable): Low stable  Clinical Decision Making (low , mod 26-74, high 1-25): FOTO Low     [x]  See Progress Note/Recertification   Patient will continue to benefit from skilled PT services to modify and progress therapeutic interventions, address functional mobility deficits, address ROM deficits, address strength deficits, analyze and address soft tissue restrictions, analyze and cue movement patterns, analyze and modify body mechanics/ergonomics, assess and modify postural abnormalities, address imbalance/dizziness and instruct in home and community integration to attain remaining goals.    Progress toward goals / Updated goals:    See plan of care     PLAN    [x]  Upgrade activities as tolerated YES Continue plan of care   []  Discharge due to :    []  Other:      Therapist: Felisha Burrell DPT    Date: 7/7/2017 Time: 8:10 AM

## 2017-07-07 NOTE — PROGRESS NOTES
[address] [facility] PHYSICAL THERAPY  [address] [facility] [address], [address] - Phone: [phone]  Fax: [phone] / [address] [facility]  Patient Name: Matias Bond : 1945   Medical   Diagnosis: Lower back pain [M54.5] Treatment Diagnosis: LBP   Onset Date: Approx 1 year AGE: 81 y.o. Referral Source: Maninder Quinonez MD Start of Care [facility]): 2017   Prior Hospitalization: See medical history Provider #: [id number]   Prior Level of Function: Independent w/ sedentary lifestyle   Comorbidities: Asthma, cerebral aneurysm    Medications: Verified on Patient Summary List   The Plan of Care and following information is based on the information from the initial evaluation.   =======================================================================================  Assessment / key information:  Patient is a 81 y.o. female who presents with chief complaint of intermittent LBP, particularly moving from sitting to standing position. Pt was here previously for PT for LLE radiculopathy however had to D/C prematurely secondary to removal of squamous cell carcinoma from RLE. This issue has resolved and patient is able to participate in PT without restrictions. Pt presents today with LE strength deficits, dec lumbar and thoracic mobility, dec core strength and LE flexibility deficits. Patient would benefit from skilled PT services to address these issues and improve the above mentioned impairments.   Thank you for this referral.    ======================================================================================  Eval Complexity: History: HIGH Complexity :3+ comorbidities / personal factors will impact the outcome/ POC Exam:LOW Complexity : 1-2 Standardized tests and measures addressing body structure, function, activity limitation and / or participation in recreation  Presentation: LOW Complexity : Stable, uncomplicated  Clinical Decision Making:LOW Complexity : FOTO score of 75-100Overall Complexity:LOW   Problem List: decrease ROM, decrease strength, impaired gait/ balance, decrease ADL/ functional abilitiies, decrease activity tolerance, decrease flexibility/ joint mobility and decrease transfer abilities   Treatment Plan may include any combination of the following: Therapeutic exercise, Therapeutic activities, Neuromuscular re-education, Physical agent/modality, Gait/balance training, Manual therapy, Patient education, Self Care training, Functional mobility training, Home safety training and Stair training  Patient / Family readiness to learn indicated by: asking questions, trying to perform skills and interest  Persons(s) to be included in education: patient (P)  Barriers to Learning/Limitations: None  Measures taken:    Patient Goal (s): Improve strength, balance and get home exercise program   Patient self reported health status: good  Rehabilitation Potential: good     Long Term Goals: To be accomplished in  4  Weeks:  1. Patient will be independent with HEP in order to demonstrate ability to perform therapeutic exercises and continue progressing functional mobility upon D/C  2. Patient will demonstrate sit to stands x 10 reps without L/S pain in order to improve ease with transfers  3. Patient will improve FOTO score to 80% to demonstrate a meaningful improvement in functional mobility and increased quality of life      Frequency / Duration:   Patient to be seen  1  times per week for 4  weeks:  Patient / Caregiver education and instruction: self care, activity modification and exercises  G-Codes (GP): Mobility [id number] Current  CK= 40-59%   Goal  CJ= 20-39%. The severity rating is based on the FOTO Score     Therapist Signature:  Gali Varghese DPT Date: 8/8/3986   Certification Period: 7/7/17 - 10/6/17 Time: 12:25 PM ===========================================================================================  I certify that the above Physical Therapy Services are being furnished while the patient is under my care. I agree with the treatment plan and certify that this therapy is necessary. Physician Signature:        Date:       Time:     Please sign and return to [employer] or you may fax the signed copy to [phone]. Thank you.

## 2017-07-10 ENCOUNTER — HOSPITAL ENCOUNTER (OUTPATIENT)
Dept: PHYSICAL THERAPY | Age: 72
Discharge: HOME | End: 2017-07-10
Payer: MEDICARE

## 2017-07-10 PROCEDURE — 97110 THERAPEUTIC EXERCISES: CPT

## 2017-07-10 NOTE — PROGRESS NOTES
PHYSICAL THERAPY - DAILY TREATMENT NOTE    Patient Name: Malik Vo        Date: 7/10/2017  : 1945   YES Patient  Verified  Visit #:   2   of   4-6  Insurance: Payor: [place] MEDICARE / Plan: VA MEDICARE PART A & B / Product Type: Medicare /      In time: 10:30 Out time: 11:10   Total Treatment Time: 40     Medicare Time Tracking (below)   Total Timed Codes (min):  40 1:1 Treatment Time:  40     TREATMENT AREA =  LBP    SUBJECTIVE    Pain Level (on 0 to 10 scale):  0  / 10   Medication Changes/New allergies or changes in medical history, any new surgeries or procedures? NO    If yes, update Summary List   Subjective Functional Status/Changes:  []  No changes reported     Pt reporting LBP first thing in the AM that gets better as she works it out, usually lasts 10-15 minutes. OBJECTIVE    40 min Therapeutic Exercise:  [x]  See flow sheet   Rationale:    Increase core strength/stabilization, ROM/flexibility to improve pt's ability to perform ADL's with increased ease and less pain to promote increased activity tolerance. min Patient Education:  YES  Reviewed HEP   [x]  Progressed/Changed HEP based on:   Patient with no questions, continue same HEP    Discussed neutral posture with sit<>stand. Instructed pt to trial open book before getting out of bed to assess sx response and also option of placing pillow between knees with sleeping in S/L. Pt verbalized understanding     Other Objective/Functional Measures:    Min VCing and TCing for form, TA recruitment and proper breathing with ther-ex. Added sit<>stand with dowel to facilitate neutral posture. Pt challenged with sit>stand without UE A. Elevated mat for increased ease and form. Post Treatment Pain Level (on 0 to 10) scale:   0  / 10     ASSESSMENT    Assessment/Changes in Function:     Pt with good tolerance to progression of ther-ex. Pt with ms fatigue, but no c/o pain.       []  See Progress Note/Recertification Patient will continue to benefit from skilled PT services to modify and progress therapeutic interventions, address functional mobility deficits, address ROM deficits, address strength deficits, analyze and address soft tissue restrictions, analyze and cue movement patterns, analyze and modify body mechanics/ergonomics, assess and modify postural abnormalities, address imbalance/dizziness and instruct in home and community integration to attain remaining goals. Progress toward goals / Updated goals:    · Goals from IE:  Long Term Goals: To be accomplished in  4  Weeks:  1. Patient will be independent with HEP in order to demonstrate ability to perform therapeutic exercises and continue progressing functional mobility upon D/C Pt reporting compliance with HEP established at IE  2. Patient will demonstrate sit to stands x 10 reps without L/S pain in order to improve ease with transfers Initiated sit<>stand with dowel for neutral posture  3.  Patient will improve FOTO score to 80% to demonstrate a meaningful improvement in functional mobility and increased quality of life        PLAN    []  Upgrade activities as tolerated YES Continue plan of care   []  Discharge due to :    []  Other:      Therapist: Nayana Catalan PTA    Date: 7/10/2017 Time: 10:47 AM     Future Appointments  Date Time Provider Scott Metz   7/18/2017 2:00 PM Tony Coyne PT [facility]   [facility]2017 [facility]   7/24/2017 2:30 PM Lenora Parker [facility]

## 2017-07-31 ENCOUNTER — HOSPITAL ENCOUNTER (OUTPATIENT)
Dept: PHYSICAL THERAPY | Age: 72
Discharge: HOME | End: 2017-07-31
Payer: MEDICARE

## 2017-07-31 PROCEDURE — 97110 THERAPEUTIC EXERCISES: CPT

## 2017-07-31 NOTE — PROGRESS NOTES
PHYSICAL THERAPY - DAILY TREATMENT NOTE    Patient Name: All Biggs        Date: 2017  : 1945   YES Patient  Verified  Visit #:   3   of   4-6  Insurance: Payor: [place] MEDICARE / Plan: VA MEDICARE PART A & B / Product Type: Medicare /      In time: 1:00 Out time: 1:38   Total Treatment Time: 38     Medicare Time Tracking (below)   Total Timed Codes (min):  38 1:1 Treatment Time:  38     TREATMENT AREA =   LBP    SUBJECTIVE    Pain Level (on 0 to 10 scale):  2  / 10   Medication Changes/New allergies or changes in medical history, any new surgeries or procedures? NO    If yes, update Summary List   Subjective Functional Status/Changes:  []  No changes reported     Pt reports that she had family in town so she did a lot more activity and wasn't good about doing her exercises, states she feels like she took 10 steps back because L hip is starting to bother her again, for example if she's walking for too long in the grocery store she will have to sit down and rest for about 5 minutes, then she's good to go again. OBJECTIVE    38 min Therapeutic Exercise:  [x]  See flow sheet   Rationale:     increase ROM, increase strength, improve coordination, improve balance and increase proprioception to promote increased functional mobility and increased activity tolerance with ADL's.      min Patient Education:  YES  Reviewed HEP   []  Progressed/Changed HEP based on: Other Objective/Functional Measures:    Pt reports decreased pain L hip with ther-ex. Added hip 3 way with band and mini squats with IR/ER for increased hip stability/strength. Pt with c/o ms fatigue L>R LE with ther-ex. Post Treatment Pain Level (on 0 to 10) scale:   1  / 10     ASSESSMENT    Assessment/Changes in Function:     Pt reporting return on L hip pain with ADL's recently. Pt with good tolerance to progression of ther-ex.       []  See Progress Note/Recertification   Patient will continue to benefit from skilled PT services to modify and progress therapeutic interventions, address functional mobility deficits, address ROM deficits, address strength deficits, analyze and address soft tissue restrictions, analyze and cue movement patterns, analyze and modify body mechanics/ergonomics, assess and modify postural abnormalities, address imbalance/dizziness and instruct in home and community integration to attain remaining goals. Progress toward goals / Updated goals:    · Long Term Goals: To be accomplished in  4  Weeks:  1. Patient will be independent with HEP in order to demonstrate ability to perform therapeutic exercises and continue progressing functional mobility upon D/C Pt reporting poor compliance with HEP recently, but plans to get back in routine. 2. Patient will demonstrate sit to stands x 10 reps without L/S pain in order to improve ease with transfers Initiated sit<>stand with dowel for neutral posture  3.  Patient will improve FOTO score to 80% to demonstrate a meaningful improvement in functional mobility and increased quality of life     PLAN    []  Upgrade activities as tolerated YES Continue plan of care   []  Discharge due to :    []  Other:      Therapist: Floyd Russell PTA    Date: 7/31/2017 Time: 1:04 PM     Future Appointments  Date Time Provider Scott Metz   8/3/2017 1:00 PM [facility]   8/7/2017 2:00 PM [facility]   8/14/2017 2:00 PM [facility]   [facility]2017 [facility]   8/21/2017 2:00 PM [facility]

## 2017-08-03 ENCOUNTER — HOSPITAL ENCOUNTER (OUTPATIENT)
Dept: PHYSICAL THERAPY | Age: 72
Discharge: HOME | End: 2017-08-03
Payer: MEDICARE

## 2017-08-03 PROCEDURE — 97110 THERAPEUTIC EXERCISES: CPT

## 2017-08-03 NOTE — PROGRESS NOTES
PHYSICAL THERAPY - DAILY TREATMENT NOTE      Patient Name: Darya Wells        Date: 8/3/2017  : 1945   YES Patient  Verified  Visit #:   4   of   4-6  Insurance: Payor: [place] MEDICARE / Plan: VA MEDICARE PART A & B / Product Type: Medicare /       In time: 1:00 Out time: 1:55   Total Treatment Time: 55 min     Medicare Time Tracking (below)   Total Timed Codes (min):  55 1:1 Treatment Time:  55     TREATMENT AREA =  Lower back pain [M54.5]    SUBJECTIVE    Pain Level (on 0 to 10 scale):  0  / 10   Medication Changes/New allergies or changes in medical history, any new surgeries or procedures? NO    If yes, update Summary List   Subjective Functional Status/Changes:  []  No changes reported     \"Those clams were hard for me last time. \"        OBJECTIVE    55 (55) min Therapeutic Exercise:  [x]  See flow sheet   Rationale:      increase ROM, increase strength, improve coordination, improve balance and increase proprioception to improve the patients ability to performed ADLs, functional transfers and return to recreation/hobbies without LBP     1 min Patient Education:  YES  Reviewed HEP   []  Progressed/Changed HEP based on: Other Objective/Functional Measures:    No VC required for proper performance of walking lunges  Added pallof press with walkouts. Cues required to inc BEL; pt reports inc difficulty moving the right  Added single limb TG squats L10  Difficulty with bridges w/ alternating LAQ.  Final 2 reps performed with alt marches instead     Post Treatment Pain Level (on 0 to 10) scale:   0  / 10     ASSESSMENT    Assessment/Changes in Function:     Pt progressing LE/core strengthening interventions very well today and without c/o BP     []  See Progress Note/Recertification   Patient will continue to benefit from skilled PT services to modify and progress therapeutic interventions, address functional mobility deficits, address ROM deficits, address strength deficits, analyze and address soft tissue restrictions, analyze and cue movement patterns, analyze and modify body mechanics/ergonomics, assess and modify postural abnormalities and instruct in home and community integration to attain remaining goals. Progress toward goals / Updated goals:    · Long Term Goals: To be accomplished in  4  Weeks:  1. Patient will be independent with HEP in order to demonstrate ability to perform therapeutic exercises and continue progressing functional mobility upon D/C. Progressed therex HEP  2. Patient will demonstrate sit to stands x 10 reps without L/S pain in order to improve ease with transfers. No pain today  3.  Patient will improve FOTO score to 80% to demonstrate a meaningful improvement in functional mobility and increased quality of life       PLAN    [x]  Upgrade activities as tolerated YES Continue plan of care   []  Discharge due to :    []  Other:      Therapist: Felisha Burrell DPT    Date: 8/3/2017 Time: 1:32 PM     Future Appointments  Date Time Provider Scott Metz   8/7/2017 2:00 PM [facility]   8/14/2017 2:00 PM [facility]   [facility]2017 [facility]   8/21/2017 2:00 PM [facility]

## 2017-08-07 ENCOUNTER — HOSPITAL ENCOUNTER (OUTPATIENT)
Dept: PHYSICAL THERAPY | Age: 72
Discharge: HOME | End: 2017-08-07
Payer: MEDICARE

## 2017-08-07 PROCEDURE — 97110 THERAPEUTIC EXERCISES: CPT

## 2017-08-07 PROCEDURE — G8978 MOBILITY CURRENT STATUS: HCPCS

## 2017-08-07 PROCEDURE — G8979 MOBILITY GOAL STATUS: HCPCS

## 2017-08-07 NOTE — PROGRESS NOTES
[facility] PHYSICAL THERAPY  [address] [place] [address] [place], [address] - Phone: [phone]  Fax: [phone]  PROGRESS NOTE  Patient Name: Steven Nye : 1945   Treatment/Medical Diagnosis: Lower back pain [M54.5]   Referral Source: Phil Villasenor MD     Date of Initial Visit: 17 Attended Visits: 5 Missed Visits: 2     SUMMARY OF TREATMENT  Patient's POC has consisted of therex, therapeutic activities, manual therapy prn, modalities prn, pt. education, and a comprehensive HEP. Treatment strategies used to address functional mobility deficits, ROM deficits, strength deficits, analyze and address soft tissue restrictions, analyze and cue movement patterns, analyze and modify body mechanics/ergonomics, assess and modify postural abnormalities and instruct in home and community integration. Key Functional Changes/Progress  Patient has progressed very well with therapeutic interventions since initial evaluation. Pt's only complaint is slight morning low back pain that improves with activity/movement. Pt's FOTO functional score improved to 68% from 54% at initial evaluation indicating decreased disability. Goal/Measure of Progress Goal Met? · Long Term Goals: To be accomplished in  4  Weeks:  1. Patient will be independent with HEP in order to demonstrate ability to perform therapeutic exercises and continue progressing functional mobility upon D/C  2. Patient will demonstrate sit to stands x 10 reps without L/S pain in order to improve ease with transfers  3. Patient will improve FOTO score to 80% to demonstrate a meaningful improvement in functional mobility and increased quality of life   1. Progressing    2. MET    3. Progressing     New Goals to be achieved in __3-4__  treatments:  1.  Patient will be independent with HEP in order to demonstrate ability to perform therapeutic exercises and continue progressing functional mobility upon D/C 2.   Patient will improve FOTO score to 80% to demonstrate a meaningful improvement in functional mobility and increased quality of life. Frequency / Duration:   Patient to be seen  1-2  times per week for 3  weeks:    G-Codes: Mobility [id number] Current  CJ= 20-39%   Goal  CJ= 20-39%. The severity rating is based on the FOTO Score     RECOMMENDATIONS  Continue and progress functional therex/therapeutic activity as able, utilizing manual therapy and modalities prn. Progress patient towards independent HEP to facilitate self-management of symptoms and progress gains after PT. If you have any questions/comments please contact us directly at [phone]. Thank you for allowing us to assist in the care of your patient. Therapist Signature: Daniel Diana DPT Date: 2/9/0242   Certification Period: 7/7/17 - 10/6/17     Reporting Period 7/7/17 - 8/7/17   Time: 12:11 PM   NOTE TO PHYSICIAN:  PLEASE COMPLETE THE ORDERS BELOW AND FAX TO   [facility]: [phone]. If you are unable to process this request in 24 hours please contact our office: [phone].    ___ I have read the above report and request that my patient continue as recommended.   ___ I have read the above report and request that my patient continue therapy with the following changes/special instructions:_________________________________________________________   ___ I have read the above report and request that my patient be discharged from therapy.      Physician Signature:        Date:       Time:

## 2017-08-07 NOTE — PROGRESS NOTES
PHYSICAL THERAPY - DAILY TREATMENT NOTE      Patient Name: Katie Monk        Date: 2017  : 1945   YES Patient  Verified  Visit #:   5   of   4-6  Insurance: Payor: [place] / Plan: [place] MED[place]ARE PART A & B / Product Type: Medicare /      In time: 2:00 Out time: 2:55   Total Treatment Time: 55 min     Medicare Time Tracking (below)   Total Timed Codes (min):  55 1:1 Treatment Time:  55     TREATMENT AREA =  Lower back pain [M54.5]    SUBJECTIVE     Pain Level (on 0 to 10 scale):  0  / 10   Medication Changes/New allergies or changes in medical history, any new surgeries or procedures? NO    If yes, update Summary List   Subjective Functional Status/Changes:  []  No changes reported     \"I'm definitely better than when I started. \"        OBJECTIVE    45 (45) min Therapeutic Exercise:  [x]  See flow sheet   Rationale:      increase ROM, increase strength, improve coordination, improve balance and increase proprioception to improve the patients ability to perform ADL's and functional mobility with increased ease and efficiency of movement      10 (10) min Therapeutic Activity: FOTO, Re-evaluation   Rationale: To assess current functional limitations and review POC    1 min Patient Education:  YES  Reviewed HEP   []  Progressed/Changed HEP based on:         Other Objective/Functional Measures:    See PN     Post Treatment Pain Level (on 0 to 10) scale:   0  / 10     ASSESSMENT    Assessment/Changes in Function:     See PN     []  See Progress Note/Recertification   Patient will continue to benefit from skilled PT services to modify and progress therapeutic interventions, address functional mobility deficits, address ROM deficits, address strength deficits, analyze and address soft tissue restrictions, analyze and cue movement patterns, analyze and modify body mechanics/ergonomics, assess and modify postural abnormalities, address imbalance/dizziness and instruct in home and community integration to attain remaining goals.      Progress toward goals / Updated goals:    See PN     PLAN    [x]  Upgrade activities as tolerated YES Continue plan of care   []  Discharge due to :    []  Other:      Therapist: Yudith Wright DPT    Date: 8/7/2017 Time: 12:09 PM     Future Appointments  Date Time Provider Scott Metz   8/7/2017 2:00 PM [facility]   8/14/2017 2:00 PM [facility]   8/18/2017 2:00 PM [address] [place] [address]   8/21/2017 2:00 PM [facility]

## 2017-08-14 ENCOUNTER — HOSPITAL ENCOUNTER (OUTPATIENT)
Dept: PHYSICAL THERAPY | Age: 72
Discharge: HOME | End: 2017-08-14
Payer: MEDICARE

## 2017-08-14 PROCEDURE — 97110 THERAPEUTIC EXERCISES: CPT

## 2017-08-14 NOTE — PROGRESS NOTES
PHYSICAL THERAPY - DAILY TREATMENT NOTE      Patient Name: Enrique Garcia        Date: 2017  : 1945   YES Patient  Verified  Visit #:   6   of   9-10  Insurance: Payor: Cassie Teresa / Plan: [place] MEDICARE PART A & B / Product Type: Medicare /      In time: 2:00 Out time: 2:45   Total Treatment Time: 45 min     Medicare Time Tracking (below)   Total Timed Codes (min):  45 1:1 Treatment Time:  40     TREATMENT AREA =  Lower back pain [M54.5]    SUBJECTIVE    Pain Level (on 0 to 10 scale):  0  / 10   Medication Changes/New allergies or changes in medical history, any new surgeries or procedures? NO    If yes, update Summary List   Subjective Functional Status/Changes:  []  No changes reported     \"I had a good weekend; no back pain. \"        OBJECTIVE    45 (40) min Therapeutic Exercise:  [x]  See flow sheet   Rationale:      increase ROM, increase strength, improve coordination, improve balance and increase proprioception to improve the patients ability to perform ADL's and functional mobility with increased ease and efficiency of movement      1 min Patient Education:  YES  Reviewed HEP   []  Progressed/Changed HEP based on: Other Objective/Functional Measures:    TM warm-up x 5 minutes at 2.8 mph  MinVC required for proper therex performance  Added lat ambulation with green TB superior to knee     Post Treatment Pain Level (on 0 to 10) scale:   0  / 10     ASSESSMENT    Assessment/Changes in Function:     Patient tolerated core/LE strengthening progression well today.      []  See Progress Note/Recertification   Patient will continue to benefit from skilled PT services to modify and progress therapeutic interventions, address functional mobility deficits, address ROM deficits, address strength deficits, analyze and address soft tissue restrictions, analyze and cue movement patterns, analyze and modify body mechanics/ergonomics, assess and modify postural abnormalities, address imbalance/dizziness and instruct in home and community integration to attain remaining goals. Progress toward goals / Updated goals:    New Goals to be achieved in __3-4__  treatments:  1. Patient will be independent with HEP in order to demonstrate ability to perform therapeutic exercises and continue progressing functional mobility upon D/C  2. Patient will improve FOTO score to 80% to demonstrate a meaningful improvement in functional mobility and increased quality of life.  Progressing       PLAN    [x]  Upgrade activities as tolerated YES Continue plan of care   []  Discharge due to :    []  Other:      Therapist: Ruth Shore DPT    Date: 8/14/2017 Time: 9:57 AM     Future Appointments  Date Time Provider Scott Metz   8/14/2017 2:00 PM Rana[facility] [place] [facility]   8/18/2017 2:00 PM [address] [place] [address]   8/21/2017 2:00 PM Ranae [facility]

## 2017-08-18 ENCOUNTER — APPOINTMENT (OUTPATIENT)
Dept: PHYSICAL THERAPY | Age: 72
End: 2017-08-18
Payer: MEDICARE

## 2017-08-21 ENCOUNTER — HOSPITAL ENCOUNTER (OUTPATIENT)
Dept: PHYSICAL THERAPY | Age: 72
Discharge: HOME | End: 2017-08-21
Payer: MEDICARE

## 2017-08-21 PROCEDURE — 97110 THERAPEUTIC EXERCISES: CPT

## 2017-08-21 PROCEDURE — G8980 MOBILITY D/C STATUS: HCPCS

## 2017-08-21 PROCEDURE — 97530 THERAPEUTIC ACTIVITIES: CPT

## 2017-08-21 PROCEDURE — G8979 MOBILITY GOAL STATUS: HCPCS

## 2017-08-21 NOTE — PROGRESS NOTES
PHYSICAL THERAPY - DAILY TREATMENT NOTE      Patient Name: Darya Wells        Date: 2017  : 1945   YES Patient  Verified  Visit #:   7     Insurance: Payor: [place] [employer] / Plan: [place] MEDICARE PART A & B / Product Type: Medicare /      In time: 1:56 Out time: 2:40   Total Treatment Time: 44     Medicare Time Tracking (below)   Total Timed Codes (min):  44 1:1 Treatment Time:  38     TREATMENT AREA =  Lower back pain [M54.5]    SUBJECTIVE    Pain Level (on 0 to 10 scale):  0  / 10   Medication Changes/New allergies or changes in medical history, any new surgeries or procedures? NO    If yes, update Summary List   Subjective Functional Status/Changes:  []  No changes reported     \"I am good. Today is my last day right? \"        OBJECTIVE    34 (28) min Therapeutic Exercise:  [x]  See flow sheet   Rationale:      increase ROM, increase strength, improve balance and increase proprioception to improve the patients ability to perform ADLs with ease and decrease pain with recreational activities. 10 (10) min Therapeutic Activity: FOTO and D/c summary   Rationale: To assess goal completion. 1 min Patient Education:  YES  Reviewed HEP   []  Progressed/Changed HEP based on:         Other Objective/Functional Measures:    ROM/Strength                      Strength (1-5)  Hip Left Right   Flexion 4+ 4+   Extension 4+ 4+   Abduction 4+ 4+   ER 4 4   IR 5 5   Knee Left Right   Extension 5 5   Flexion 5 5        Post Treatment Pain Level (on 0 to 10) scale:   0  / 10     ASSESSMENT    Assessment/Changes in Function:     See D/c note      []  See Progress Note/Recertification   Patient will continue to benefit from skilled PT services to modify and progress therapeutic interventions, address functional mobility deficits, address ROM deficits, address strength deficits, analyze and address soft tissue restrictions, analyze and cue movement patterns, analyze and modify body mechanics/ergonomics and assess and modify postural abnormalities to attain remaining goals. Progress toward goals / Updated goals:    See D/c note      PLAN    [x]  Upgrade activities as tolerated YES Continue plan of care   []  Discharge due to :    []  Other:      Therapist: Gali Varghese DPT    Date: 8/21/2017 Time: 2:03 PM     No future appointments.

## 2017-08-28 ENCOUNTER — APPOINTMENT (OUTPATIENT)
Dept: PHYSICAL THERAPY | Age: 72
End: 2017-08-28
Payer: MEDICARE

## 2017-08-31 ENCOUNTER — APPOINTMENT (OUTPATIENT)
Dept: PHYSICAL THERAPY | Age: 72
End: 2017-08-31
Payer: MEDICARE

## 2018-04-20 ENCOUNTER — HOSPITAL ENCOUNTER (OUTPATIENT)
Dept: LAB | Age: 73
Discharge: HOME | End: 2018-04-20
Payer: MEDICARE

## 2018-04-20 PROCEDURE — 88305 TISSUE EXAM BY PATHOLOGIST: CPT | Performed by: PLASTIC SURGERY

## 2018-06-14 ENCOUNTER — HOSPITAL ENCOUNTER (OUTPATIENT)
Dept: LAB | Age: 73
Discharge: HOME | End: 2018-06-14
Payer: MEDICARE

## 2018-06-14 PROCEDURE — 88331 PATH CONSLTJ SURG 1 BLK 1SPC: CPT | Performed by: PLASTIC SURGERY

## 2018-06-14 PROCEDURE — 88305 TISSUE EXAM BY PATHOLOGIST: CPT | Performed by: PLASTIC SURGERY

## 2019-01-25 ENCOUNTER — HOSPITAL ENCOUNTER (OUTPATIENT)
Dept: LAB | Age: 74
Discharge: HOME | End: 2019-01-25
Payer: COMMERCIAL

## 2019-01-25 PROCEDURE — 88305 TISSUE EXAM BY PATHOLOGIST: CPT

## 2019-02-26 ENCOUNTER — HOSPITAL ENCOUNTER (OUTPATIENT)
Dept: LAB | Age: 74
Discharge: HOME | End: 2019-02-26
Payer: MEDICARE

## 2019-02-26 PROCEDURE — 88305 TISSUE EXAM BY PATHOLOGIST: CPT

## 2021-05-11 ENCOUNTER — HOSPITAL ENCOUNTER (OUTPATIENT)
Dept: LAB | Age: 76
Discharge: HOME | End: 2021-05-11
Payer: MEDICARE

## 2021-05-11 PROCEDURE — 88305 TISSUE EXAM BY PATHOLOGIST: CPT

## 2021-11-16 ENCOUNTER — HOSPITAL ENCOUNTER (OUTPATIENT)
Dept: LAB | Age: 76
Discharge: HOME | End: 2021-11-16
Payer: MEDICARE

## 2021-11-16 PROCEDURE — 88305 TISSUE EXAM BY PATHOLOGIST: CPT

## 2022-07-12 ENCOUNTER — HOSPITAL ENCOUNTER (OUTPATIENT)
Dept: LAB | Age: 77
Discharge: HOME | End: 2022-07-12
Payer: COMMERCIAL

## 2022-07-12 PROCEDURE — 88305 TISSUE EXAM BY PATHOLOGIST: CPT

## 2022-08-05 ENCOUNTER — HOSPITAL ENCOUNTER (OUTPATIENT)
Dept: LAB | Age: 77
Discharge: HOME | End: 2022-08-05
Payer: MEDICARE

## 2022-08-05 PROCEDURE — 88305 TISSUE EXAM BY PATHOLOGIST: CPT

## 2022-10-28 ENCOUNTER — HOSPITAL ENCOUNTER (OUTPATIENT)
Dept: LAB | Age: 77
Discharge: HOME | End: 2022-10-28
Payer: MEDICARE

## 2022-10-28 PROCEDURE — 88305 TISSUE EXAM BY PATHOLOGIST: CPT

## 2022-10-28 PROCEDURE — 88331 PATH CONSLTJ SURG 1 BLK 1SPC: CPT
